# Patient Record
Sex: FEMALE | Race: WHITE | NOT HISPANIC OR LATINO | Employment: STUDENT | URBAN - METROPOLITAN AREA
[De-identification: names, ages, dates, MRNs, and addresses within clinical notes are randomized per-mention and may not be internally consistent; named-entity substitution may affect disease eponyms.]

---

## 2022-06-03 ENCOUNTER — LAB REQUISITION (OUTPATIENT)
Dept: LAB | Facility: CLINIC | Age: 22
End: 2022-06-03

## 2022-06-03 PROCEDURE — 86787 VARICELLA-ZOSTER ANTIBODY: CPT | Performed by: INTERNAL MEDICINE

## 2022-06-03 PROCEDURE — 86481 TB AG RESPONSE T-CELL SUSP: CPT | Performed by: INTERNAL MEDICINE

## 2022-06-05 LAB
VZV IGG SER QL IA: 951.4 INDEX
VZV IGG SER QL IA: POSITIVE

## 2022-06-06 LAB
GAMMA INTERFERON BACKGROUND BLD IA-ACNC: 0.02 IU/ML
M TB IFN-G BLD-IMP: NEGATIVE
M TB IFN-G CD4+ BCKGRND COR BLD-ACNC: 9.11 IU/ML
MITOGEN IGNF BCKGRD COR BLD-ACNC: 0 IU/ML
MITOGEN IGNF BCKGRD COR BLD-ACNC: 0 IU/ML
QUANTIFERON MITOGEN: 9.13 IU/ML
QUANTIFERON NIL TUBE: 0.02 IU/ML
QUANTIFERON TB1 TUBE: 0.02 IU/ML
QUANTIFERON TB2 TUBE: 0.02

## 2022-06-26 ENCOUNTER — HEALTH MAINTENANCE LETTER (OUTPATIENT)
Age: 22
End: 2022-06-26

## 2022-11-21 ENCOUNTER — HEALTH MAINTENANCE LETTER (OUTPATIENT)
Age: 22
End: 2022-11-21

## 2023-07-08 ENCOUNTER — HEALTH MAINTENANCE LETTER (OUTPATIENT)
Age: 23
End: 2023-07-08

## 2023-09-05 ENCOUNTER — TELEPHONE (OUTPATIENT)
Dept: PEDIATRICS | Facility: CLINIC | Age: 23
End: 2023-09-05
Payer: COMMERCIAL

## 2023-09-05 NOTE — TELEPHONE ENCOUNTER
Reason for Call:  Appointment Request    Patient requesting this type of appt:  Hospital/ED Follow-Up: lung pain causing difficulty sleeping    Requested provider:  Dr. Pringle preferred, otherwise any other [facility] provider    Reason patient unable to be scheduled: Not within requested timeframe    When does patient want to be seen/preferred time:  Friday, Sept 8th or September 15th    Comments: Mom sees Dr. Pringle and would love if patient could see her doctor. However, she stated she would be willing to see anyone on 9/8 or 9/15 at anytime. Please call Mom back    Could we send this information to you in [employer] or would you prefer to receive a phone call?:   Patient would prefer a phone call   Okay to leave a detailed message?: Yes at Cell number on file:    Telephone Information:   Mobile [phone]       Call taken on 9/5/2023 at 9:17 AM by Krystal Ibarra

## 2023-09-07 ENCOUNTER — TRANSFERRED RECORDS (OUTPATIENT)
Dept: MULTI SPECIALTY CLINIC | Facility: CLINIC | Age: 23
End: 2023-09-07
Payer: COMMERCIAL

## 2023-09-07 LAB
C TRACH DNA SPEC QL PROBE+SIG AMP: NEGATIVE
HEP C HIM: NORMAL
HIV 1&2 EXT: NORMAL
N GONORRHOEA DNA SPEC QL PROBE+SIG AMP: NEGATIVE
PAP-ABSTRACT: NORMAL
SPECIMEN DESCRIP: NORMAL
SPECIMEN DESCRIPTION: NORMAL

## 2023-09-15 ENCOUNTER — OFFICE VISIT (OUTPATIENT)
Dept: PEDIATRICS | Facility: CLINIC | Age: 23
End: 2023-09-15
Payer: COMMERCIAL

## 2023-09-15 VITALS
DIASTOLIC BLOOD PRESSURE: 85 MMHG | TEMPERATURE: 98.6 F | RESPIRATION RATE: 20 BRPM | HEART RATE: 78 BPM | BODY MASS INDEX: 21.48 KG/M2 | OXYGEN SATURATION: 97 % | HEIGHT: 65 IN | SYSTOLIC BLOOD PRESSURE: 124 MMHG | WEIGHT: 128.9 LBS

## 2023-09-15 DIAGNOSIS — R07.89 CHEST WALL PAIN: Primary | ICD-10-CM

## 2023-09-15 PROBLEM — Z87.820 HISTORY OF CONCUSSION: Status: ACTIVE | Noted: 2023-09-15

## 2023-09-15 PROCEDURE — 99203 OFFICE O/P NEW LOW 30 MIN: CPT | Mod: 25 | Performed by: PHYSICIAN ASSISTANT

## 2023-09-15 PROCEDURE — 90686 IIV4 VACC NO PRSV 0.5 ML IM: CPT | Performed by: PHYSICIAN ASSISTANT

## 2023-09-15 PROCEDURE — 90471 IMMUNIZATION ADMIN: CPT | Performed by: PHYSICIAN ASSISTANT

## 2023-09-15 RX ORDER — DROSPIRENONE AND ETHINYL ESTRADIOL 0.03MG-3MG
1 KIT ORAL DAILY
COMMUNITY
Start: 2023-09-04

## 2023-09-15 ASSESSMENT — PAIN SCALES - GENERAL: PAINLEVEL: NO PAIN (0)

## 2023-09-15 NOTE — PROGRESS NOTES
"  Assessment & Plan     Chest wall pain  Reviewed emergency department visit from [facility]. D- dimer, elevated, but EKG, CT neg.   Symptoms resolved one week ago.   Will continue to monitor.   Follow up prn  Preventative care visits are through [facility] women[facility] health clinic.              MED REC REQUIRED  Post Medication Reconciliation Status: patient was not discharged from an inpatient facility or TCU      LINA Bello [facility] GENE Galdamez is a 22 year old, presenting for the following health issues:  ER F/U        9/15/2023    11:21 AM   Additional Questions   Roomed by Carley Gan   Accompanied by N/A       HPI     ED/UC Followup:    Facility:  [facility] Emergency Department   Date of visit: 9/3/23  Reason for visit: Left rib/lung pain   Current Status: Doing better, no pain      Emergency department follow up   8/31/23 woke up with left side chest, lower rib pain.hurt with deep breath and movement. Not to touch. Did not hurt to touch. Was sneezing from allergies, but does not remember hurting rib during that time. Went to emergency department on 9/3/23- work up EKG, labs, ct were neg, D- dimer was elevated.    No family hx of PE, DVT or similar.    No fever chills, nausea vomiting, abdominal pain,rash. No cough, sob, wheezing or leg swelling.    Symptoms resolved one week ago.     Review of Systems         Objective    /85 (BP Location: Right arm, Patient Position: Sitting, Cuff Size: Adult Regular)   Pulse 78   Temp 98.6  F (37  C) (Tympanic)   Resp 20   Ht 1.645 m (5' 4.76\")   Wt 58.5 kg (128 lb 14.4 oz)   LMP 09/06/2023 (Approximate)   SpO2 97%   BMI 21.61 kg/m    Body mass index is 21.61 kg/m .  Physical Exam   GENERAL: healthy, alert and no distress  NECK: no adenopathy, no asymmetry, masses, or scars and thyroid normal to palpation  RESP: lungs clear to auscultation - no rales, rhonchi or wheezes  CV: regular rate and rhythm, normal S1 " S2, no S3 or S4, no murmur, click or rub, no peripheral edema and peripheral pulses strong  ABDOMEN: soft, nontender, no hepatosplenomegaly, no masses and bowel sounds normal  MS: no gross musculoskeletal defects noted, no edema

## 2024-08-08 ENCOUNTER — PATIENT OUTREACH (OUTPATIENT)
Dept: CARE COORDINATION | Facility: CLINIC | Age: 24
End: 2024-08-08
Payer: COMMERCIAL

## 2024-08-22 ENCOUNTER — PATIENT OUTREACH (OUTPATIENT)
Dept: CARE COORDINATION | Facility: CLINIC | Age: 24
End: 2024-08-22
Payer: COMMERCIAL

## 2024-11-09 ENCOUNTER — HEALTH MAINTENANCE LETTER (OUTPATIENT)
Age: 24
End: 2024-11-09